# Patient Record
Sex: MALE | Race: WHITE | HISPANIC OR LATINO | Employment: FULL TIME | URBAN - METROPOLITAN AREA
[De-identification: names, ages, dates, MRNs, and addresses within clinical notes are randomized per-mention and may not be internally consistent; named-entity substitution may affect disease eponyms.]

---

## 2021-06-23 ENCOUNTER — OFFICE VISIT (OUTPATIENT)
Dept: URGENT CARE | Facility: PHYSICIAN GROUP | Age: 61
End: 2021-06-23

## 2021-06-23 VITALS
SYSTOLIC BLOOD PRESSURE: 116 MMHG | BODY MASS INDEX: 29.03 KG/M2 | HEART RATE: 76 BPM | TEMPERATURE: 97.8 F | WEIGHT: 185 LBS | RESPIRATION RATE: 16 BRPM | DIASTOLIC BLOOD PRESSURE: 56 MMHG | OXYGEN SATURATION: 100 % | HEIGHT: 67 IN

## 2021-06-23 DIAGNOSIS — R42 DIZZINESS: ICD-10-CM

## 2021-06-23 PROBLEM — Z72.0 TOBACCO USER: Status: ACTIVE | Noted: 2021-06-23

## 2021-06-23 PROBLEM — E55.9 VITAMIN D DEFICIENCY: Status: ACTIVE | Noted: 2021-06-23

## 2021-06-23 PROBLEM — R73.03 PREDIABETES: Status: ACTIVE | Noted: 2021-06-23

## 2021-06-23 PROBLEM — E78.5 HYPERLIPIDEMIA: Status: ACTIVE | Noted: 2021-06-23

## 2021-06-23 PROBLEM — Z91.843 RISK FOR DENTAL CARIES, HIGH: Status: ACTIVE | Noted: 2021-06-23

## 2021-06-23 LAB — GLUCOSE BLD-MCNC: 100 MG/DL (ref 70–100)

## 2021-06-23 PROCEDURE — 82962 GLUCOSE BLOOD TEST: CPT | Performed by: PHYSICIAN ASSISTANT

## 2021-06-23 PROCEDURE — 99204 OFFICE O/P NEW MOD 45 MIN: CPT | Performed by: PHYSICIAN ASSISTANT

## 2021-06-23 RX ORDER — MECLIZINE HYDROCHLORIDE 25 MG/1
25 TABLET ORAL 3 TIMES DAILY PRN
Qty: 30 TABLET | Refills: 0 | Status: SHIPPED | OUTPATIENT
Start: 2021-06-23

## 2021-06-23 ASSESSMENT — ENCOUNTER SYMPTOMS
SENSORY CHANGE: 0
VOMITING: 0
EYES NEGATIVE: 1
RESPIRATORY NEGATIVE: 1
NECK PAIN: 0
WEAKNESS: 0
HEADACHES: 0
SORE THROAT: 0
SPEECH CHANGE: 0
FEVER: 0
GASTROINTESTINAL NEGATIVE: 1
VERTIGO: 1
TINGLING: 0
NUMBNESS: 0
CHILLS: 0
VISUAL CHANGE: 0
ABDOMINAL PAIN: 0
MUSCULOSKELETAL NEGATIVE: 1
PALPITATIONS: 0
NAUSEA: 0
DIZZINESS: 1

## 2021-06-23 NOTE — LETTER
June 23, 2021         Patient: Stiven Lee   YOB: 1960   Date of Visit: 6/23/2021           To Whom it May Concern:    Stiven Lee was seen in my clinic on 6/23/2021. He is excused from work until Weds. June 30th.    If you have any questions or concerns, please don't hesitate to call.        Sincerely,           Russell Sánchez P.A.-C.  Electronically Signed

## 2021-06-23 NOTE — LETTER
June 23, 2021         Patient: Stiven Lee   YOB: 1960   Date of Visit: 6/23/2021           To Whom it May Concern:    Stiven Lee was seen in my clinic on 6/23/2021. He {Return to school/sport/work:92569}    If you have any questions or concerns, please don't hesitate to call.        Sincerely,           Russell Sánchez P.A.-C.  Electronically Signed

## 2021-06-23 NOTE — PROGRESS NOTES
Subjective:      Stiven Lee is a 60 y.o. male who presents with Dizziness (x1 day, light headed, feels very dizzy, shaking alot, feels like he is going to faint )            Positional dizziness off and on for the last year.  Worse since yesterday.  Symptoms worse with rapid head movement and position change.  He states he was feeling weak today and almost had a vasovagal episode.  He denies chest pain, palpitations, blurry vision, shortness of breath.  He does have prediabetes but does not currently take any medication.    Dizziness  This is a new problem. The current episode started yesterday. The problem occurs daily. The problem has been waxing and waning. Associated symptoms include vertigo. Pertinent negatives include no abdominal pain, chest pain, chills, congestion, fever, headaches, nausea, neck pain, numbness (left hand chronic), sore throat, visual change, vomiting or weakness. Exacerbated by: Position. He has tried nothing for the symptoms. The treatment provided no relief.       PMH:  has no past medical history on file.  MEDS: No current outpatient medications on file.  ALLERGIES: Not on File  SURGHX: No past surgical history on file.  SOCHX:  reports that he has never smoked. He has never used smokeless tobacco.  FH: family history is not on file.    Review of Systems   Constitutional: Negative for chills and fever.   HENT: Negative for congestion, ear pain and sore throat.    Eyes: Negative.    Respiratory: Negative.    Cardiovascular: Negative for chest pain, palpitations and leg swelling.   Gastrointestinal: Negative.  Negative for abdominal pain, nausea and vomiting.   Musculoskeletal: Negative.  Negative for neck pain.   Neurological: Positive for dizziness and vertigo. Negative for tingling, sensory change, speech change, weakness, numbness (left hand chronic) and headaches.       Medications, Allergies, and current problem list reviewed today in Epic     Objective:     /56   " Pulse 76   Temp 36.6 °C (97.8 °F) (Temporal)   Resp 16   Ht 1.702 m (5' 7\")   Wt 83.9 kg (185 lb)   SpO2 100%   BMI 28.98 kg/m²      Physical Exam  Vitals and nursing note reviewed.   Constitutional:       General: He is not in acute distress.     Appearance: Normal appearance. He is well-developed. He is not ill-appearing, toxic-appearing or diaphoretic.   HENT:      Head: Normocephalic and atraumatic.      Right Ear: Tympanic membrane, ear canal and external ear normal.      Left Ear: Tympanic membrane, ear canal and external ear normal.      Nose: Nose normal. No congestion or rhinorrhea.      Mouth/Throat:      Mouth: Mucous membranes are moist.      Pharynx: Oropharynx is clear. No oropharyngeal exudate or posterior oropharyngeal erythema.   Eyes:      General:         Right eye: No discharge.         Left eye: No discharge.      Conjunctiva/sclera: Conjunctivae normal.   Cardiovascular:      Rate and Rhythm: Normal rate and regular rhythm.      Pulses: Normal pulses.      Heart sounds: Normal heart sounds. No murmur heard.     Pulmonary:      Effort: Pulmonary effort is normal. No respiratory distress.      Breath sounds: Normal breath sounds. No wheezing, rhonchi or rales.   Chest:      Chest wall: No tenderness.   Musculoskeletal:         General: No swelling or tenderness.      Cervical back: Normal range of motion and neck supple.      Right lower leg: No edema.      Left lower leg: No edema.   Lymphadenopathy:      Cervical: No cervical adenopathy.   Skin:     General: Skin is warm and dry.   Neurological:      General: No focal deficit present.      Mental Status: He is alert and oriented to person, place, and time. Mental status is at baseline.      Cranial Nerves: Cranial nerves are intact. No cranial nerve deficit.      Sensory: Sensation is intact. No sensory deficit.      Motor: Motor function is intact. No weakness.      Coordination: Coordination is intact. Coordination normal.      Gait: " Gait is intact. Gait normal.      Deep Tendon Reflexes: Reflexes normal.      Comments: Dizziness easily reproduced with rapid head movement and position change.   Psychiatric:         Mood and Affect: Mood normal.         Behavior: Behavior normal.         Thought Content: Thought content normal.         Judgment: Judgment normal.                        Assessment/Plan:         1. Dizziness  POCT Glucose    CBC WITHOUT DIFFERENTIAL    Comp Metabolic Panel    meclizine (ANTIVERT) 25 MG Tab    CANCELED: EKG - Clinic Performed     Glucose: 100    Positional dizziness off and on for the last year, worse for the last day.  Some weakness.  Denies fever, headache, chest pain, palpitations, shortness of breath.  Patient is prediabetic but takes no medications.  Dizziness appears to be positional worse with rapid head movement.  Vital signs are normal.  No focal neurological deficit seen and neuro exam at baseline.  Exam otherwise benign.  Dizziness easily reproduced with rapid head movement and positional change.  Glucose is normal.  Ordered EKG however patient and son declined due to insurance purposes.  Did recommend CBC and CMP, they again declined secondary to insurance purposes.  Advised I cannot exclude more serious etiology with proper work-up.  They will monitor symptoms and if he fails to improve they will return to clinic as needed.  OTC meds and conservative measures as discussed    Return to clinic or go to ED if symptoms worsen or persist. Indications for ED discussed at length. Patient/Parent/Guardian voices understanding. Follow-up with your primary care provider in 3-5 days. Red flag symptoms discussed. All side effects of medication discussed including allergic response, GI upset, tendon injury, rash, sedation etc.    Please note that this dictation was created using voice recognition software. I have made every reasonable attempt to correct obvious errors, but I expect that there are errors of grammar and  possibly content that I did not discover before finalizing the note.

## 2021-06-23 NOTE — PATIENT INSTRUCTIONS
Cómo realizar la maniobra de Epley  How to Perform the Epley Maneuver  La maniobra de Epley es un ejercicio que victoriano los síntomas del vértigo. El vértigo es la sensación de que usted o todo lo que lo rodea se mueven cuando en realidad eso no sucede. Cuando jose persona siente vértigo, puede tener la sensación de que la habitación da vueltas y dificultad para caminar. Los mareos son un poco diferentes al vértigo. Cuando jose persona está mareada, puede tener inestabilidad o sensación de desvanecimiento.  Puede realizar esta maniobra en mahmood casa cuando tenga los síntomas de vértigo. Puede realizarla 3 veces por día jackie lorena hasta que desaparezcan los síntomas.  Aunque la maniobra de Epley lo alivie del vértigo hong algunas semanas, es posible que los síntomas vuelvan. Esta maniobra victoriano los síntomas del vértigo, sandra no los mareos.  ¿Cuáles son los riesgos?  Si se realiza de forma correcta, la maniobra de Epley se considera un procedimiento seguro. En ocasiones, puede provocar mareos o náuseas que desaparecen después de un breve período. Si tiene otros síntomas, jackie cambios en la visión, debilidad o entumecimiento, deje de realizar la maniobra y llame al médico.  Cómo realizar la maniobra de Epley  1. Siéntese en el borde de jose cama o jose wells con la espalda recta y las piernas extendidas o colgando sobre el borde de la cama o la wells.  2. Gire la tawnya a medias hacia el oído o el lado afectado.  3. Recuéstese hacia atrás con la tawnya girada hasta que se encuentre recostado sobre la espalda. Es conveniente colocar jose almohada debajo de los hombros.  4. Mantenga esta posición hong 30 segundos. Es posible que tenga jose crisis de vértigo. [place] es normal.  5. Gire la tawnya en dirección opuesta hasta que el oído no afectado esté orientado al suelo.  6. Mantenga esta posición hong 30 segundos. Es posible que tenga jose crisis de vértigo. [place] es normal. Mantenga esta posición hasta que el vértigo  desaparezca.  7. Gire todo el cuerpo hacia el mismo lado que la tawnya. Mantenga esta posición hong otros 30 segundos.  8. Vuelva a sentarse.  Puede repetir brooke ejercicio hasta 3 veces por día.  Siga estas indicaciones en mahmood casa:  · Puede retomar tracy actividades normales después de realizar la maniobra de Epley.  · Pregúntele al médico si debe hacer algo en mahmood casa para evitar el vértigo. El médico puede recomendarle lo siguiente:  ? Mantener la tawnya levantada (elevada) con dos o más almohadas mientras duerme.  ? No dormir sobre el lado del oído afectado.  ? Levantarse lentamente de la cama.  ? Evitar los movimientos repentinos hong el día.  ? Evitar los movimientos de tawnya intensos, jackie mirar hacia arriba o agacharse.  Comuníquese con un médico si:  · El vértigo empeora.  · Tiene otros síntomas, jackie los siguientes:  ? Náuseas.  ? Vómitos.  ? Dolor de tawnya.  Solicite ayuda de inmediato si:  · Nota cambios en la visión.  · Sufre un dolor de tawnya o en el chase intenso o que empeora.  · No puede dejar de vomitar.  · Siente entumecimiento o debilidad nuevos en alguna parte del cuerpo.  Resumen  · El vértigo es la sensación de que usted o todo lo que lo rodea se mueven cuando en realidad eso no sucede.  · La maniobra de Epley es un ejercicio que victoriano los síntomas del vértigo.  · Si se realiza de forma correcta, la maniobra de Epley se considera un procedimiento seguro. Puede realizarla 3 veces por día jackie lorena.  Esta información no tiene jackie fin reemplazar el consejo del médico. Asegúrese de hacerle al médico cualquier pregunta que tenga.  Document Released: 12/23/2014 Document Revised: 08/14/2018 Document Reviewed: 08/14/2018  Elsevier Patient Education © 2020 [employer]  Vértigo posicional luciano  Benign Positional Vertigo  El vértigo es la sensación de que usted o todo lo que lo rodea se mueve cuando en realidad eso no sucede. El vértigo posicional luciano es el tipo de vértigo más común.  Generalmente se trata de jose enfermedad no dañina (benigna). Esta afección es posicional. [place] significa que los síntomas son desencadenados por ciertos movimientos y posiciones.  Esta afección puede ser peligrosa si ocurre mientras está haciendo algo que podría suponer un riesgo para usted o para los demás. Incluye actividades jackie conducir u operar maquinaria.  ¿Cuáles son las causas?  En muchos casos, se desconoce la causa de esta afección. Puede deberse a jose alteración en jose danny del oído interno que ayuda al cerebro a percibir el movimiento y a coordinar el equilibrio. Esta alteración puede deberse a:  · Jose infección viral (laberintitis).  · Lesiones en la tawnya.  · Un movimiento repetitivo, jackie saltar, bailar o correr.  ¿Qué incrementa el riesgo?  Es más probable que contraiga esta afección si:  · Es delon.  · Es mayor de 50 años.  ¿Cuáles son los signos o los síntomas?  Generalmente, los síntomas de brooke trastorno se presentan al  la tawnya o los ojos en diferentes direcciones. Los síntomas pueden aparecer repentinamente y suelen durar menos de un minuto. Incluyen los siguientes:  · Pérdida del equilibrio y caídas.  · Sensación de estar dando vueltas o moviéndose.  · Sensación de que el entorno está dando vueltas o moviéndose.  · Náuseas y vómitos.  · Visión borrosa.  · Mareos.  · Movimientos oculares involuntarios (nistagmo).  Los síntomas pueden ser leves y solo causar problemas menores, o pueden ser graves e interferir en la rosario cotidiana. Los episodios de vértigo posicional luciano pueden repetirse (volver a aparecer) con el transcurso del tiempo. Los síntomas pueden mejorar con el tiempo.  ¿Cómo se diagnostica?  Esta afección se puede diagnosticar en función de lo siguiente:  · Hortencia antecedentes médicos.  · Un examen físico de la tawnya, el chase y los oídos.  · Estudios, jackie, por ejemplo:  ? Resonancia magnética (RM).  ? Exploración por tomografía computarizada (TC).  ? Estudios de los  movimientos oculares. El médico puede pedirle que cambie rápidamente de posición mientras observa si se presentan síntomas de vértigo posicional luciano, por ejemplo, nistagmo. El movimiento ocular se puede estudiar con jose variedad de exámenes que están diseñados para evaluar o estimular el vértigo.  ? Electroencefalograma (EEG). Suki estudio registra la actividad eléctrica del cerebro.  ? Pruebas de audición.  James vez lo deriven a un médico especialista en problemas de la garganta, la nariz y el oído (otorrinolaringólogo), o a faith que se especializa en trastornos del sistema nervioso (neurólogo).  ¿Cómo se trata?    Esta afección se puede tratar en jose sesión en la cual el médico le pondrá la tawnya en posiciones específicas para que el oído interno se normalice. El tratamiento de esta afección pueden llevar varias sesiones. Cuando los casos son graves, james vez haya que realizar jose cirugía, sandra esto no es frecuente.   En algunos casos, el vértigo posicional luciano se resuelve por sí solo en el término de 2 o 4 semanas.  Siga estas indicaciones en mahmood casa:  Seguridad  · Muévase lentamente. Evite algunas posiciones o determinados movimientos repentinos de la tawnya y el cuerpo jackie se lo haya indicado el médico.  · Evite conducir hasta que el médico le diga que es seguro hacerlo.  · Evite operar maquinaria pesadas hasta que mahmood médico le diga que no corre riesgos.  · No walter ninguna tarea que podría ser peligrosa para usted o para otras personas en abraham de vértigo.  · Si tiene dificultad para caminar o mantener el equilibrio, use un bastón para mantener la estabilidad. Si se siente mareado o inestable, siéntese de inmediato.  · [place] tracy actividades normales jackie se lo haya indicado el médico. Pregúntele al médico qué actividades son seguras para usted.  Indicaciones generales  · [place] los medicamentos de venta leti y los recetados solamente jackie se lo haya indicado el médico.  · [place] suficiente líquido jackie para  mantener la orina de color amarillo pálido.  · Concurra a todas las visitas de control jackie se lo haya indicado el médico. [place] es importante.  Comuníquese con un médico si:  · Tiene fiebre.  · Mahmood afección empeora o presenta síntomas nuevos.  · Hortencia familiares o amigos advierten cambios en mahmood comportamiento.  · Tiene náuseas o vómitos que empeoran.  · Tiene sensación de hormigueo o de adormecimiento.  Solicite ayuda inmediatamente si:  · Tiene dificultad para hablar o para moverse.  · Esta mareado todo el tiempo.  · Se desmaya.  · Presenta sweta de tawnya intensos.  · Tiene debilidad en los brazos o las piernas.  · Presenta cambios en la audición o la visión.  · Presenta rigidez en el chase.  · Presenta sensibilidad a la tammy.  Resumen  · El vértigo es la sensación de que usted o todo lo que lo rodea se mueve cuando en realidad eso no sucede. El vértigo posicional luciano es el tipo de vértigo más común.  · Se desconoce la causa de esta afección. Puede deberse a jose alteración en jose danny del oído interno que ayuda al cerebro a percibir el movimiento y a coordinar el equilibrio.  · Los síntomas incluyen pérdida del equilibrio y caídas, sensación de que usted o mahmood entorno se mueve, náuseas y vómitos, y visión borrosa.  · Esta afección puede diagnosticarse en función de los síntomas, un examen físico y otros estudios, jackie resonancia magnética (RM), exploración por tomografía computarizada (TC), estudios del movimiento ocular y estudios de la audición.  · Siga las instrucciones de seguridad que le haya indicado el médico. También le dirán cuándo debe ponerse en contacto con el médico en abraham de problemas.  Esta información no tiene jackie fin reemplazar el consejo del médico. Asegúrese de hacerle al médico cualquier pregunta que tenga.  Document Released: 04/05/2010 Document Revised: 07/29/2019 Document Reviewed: 07/29/2019  Elsevier Patient Education © 2020 [employer]